# Patient Record
Sex: MALE | Race: WHITE
[De-identification: names, ages, dates, MRNs, and addresses within clinical notes are randomized per-mention and may not be internally consistent; named-entity substitution may affect disease eponyms.]

---

## 2019-02-08 ENCOUNTER — HOSPITAL ENCOUNTER (OUTPATIENT)
Dept: HOSPITAL 62 - RAD | Age: 39
End: 2019-02-08
Attending: FAMILY MEDICINE
Payer: OTHER GOVERNMENT

## 2019-02-08 DIAGNOSIS — M25.511: Primary | ICD-10-CM

## 2019-02-08 NOTE — RADIOLOGY REPORT (SQ)
EXAM DESCRIPTION:  MRI RT UPPER JOINT WITHOUT



COMPLETED DATE/TIME:  2/8/2019 9:29 am



REASON FOR STUDY:  RIGHT SHOULDER PAIN (M25.511) M25.511  PAIN IN RIGHT SHOULDER



COMPARISON:  None.



TECHNIQUE:  Right shoulder images acquired and stored on PACS. Multiplanar imaging to include fat sen
sitive sequences such as T1, water sensitive sequences such as FST2/STIR, cartilage sensitive sequenc
es such as FSPD/gradient-echo sequences.



LIMITATIONS:  None.



FINDINGS:  BONE MARROW AND CORTEX: No worrisome bone lesions or marrow replacement. No occult fractur
es.

JOINT OR BURSAL EFFUSION: No significant joint or bursal fluid.  No suggestion of loose bodies.

GLENO-HUMERAL ARTICULATION: Normal articulation. No subluxation. No cystic change. No osteophytes or 
cartilage loss.

ACROMION AND AC JOINT: Type 2 acromion.  Moderate AC joint arthropathy.

ROTATOR CUFF AND INTERVAL: No significant tear or signal alteration.  No cuff muscle atrophy.

No rotator interval tear. No rotator interval thickening to suggest adhesive capsulitis.

 LABRUM  AND BICEPS LABRAL COMPLEX: Intact. No labral tear. Intra-articular long-head biceps tendon n
ormal.  Distal biceps in normal location in bicipital groove.

REMAINDER OF LABRUM AND IGHL : Series 4, image 12, there is subtle signal alteration in the posterior
 inferior labrum.  No definitive tear identified.

PERIARTICULAR AND ADJACENT SOFT TISSUES: No masses or abnormal nodes.

OTHER: No other significant finding.



IMPRESSION:

1. AC joint arthropathy which can contribute to impingement syndrome.  No significant rotator cuff te
ar identified.

2. Questionable small tear of the posterior inferior labrum.  Correlate clinically.  Consider MR arth
rogram if important to evaluate.



TECHNICAL DOCUMENTATION:  JOB ID:  [id number]

 2011 [employer]- All Rights Reserved



Reading location - IP/workstation name: [email or web address]-